# Patient Record
Sex: FEMALE
[De-identification: names, ages, dates, MRNs, and addresses within clinical notes are randomized per-mention and may not be internally consistent; named-entity substitution may affect disease eponyms.]

---

## 2023-03-05 ENCOUNTER — NON-APPOINTMENT (OUTPATIENT)
Age: 73
End: 2023-03-05

## 2023-03-06 PROBLEM — Z00.00 ENCOUNTER FOR PREVENTIVE HEALTH EXAMINATION: Status: ACTIVE | Noted: 2023-03-06

## 2023-03-07 ENCOUNTER — APPOINTMENT (OUTPATIENT)
Dept: ORTHOPEDIC SURGERY | Facility: CLINIC | Age: 73
End: 2023-03-07
Payer: SELF-PAY

## 2023-03-07 VITALS — HEIGHT: 70 IN | BODY MASS INDEX: 21.47 KG/M2 | WEIGHT: 150 LBS

## 2023-03-07 DIAGNOSIS — S52.571A OTHER INTRAARTICULAR FRACTURE OF LOWER END OF RIGHT RADIUS, INITIAL ENCOUNTER FOR CLOSED FRACTURE: ICD-10-CM

## 2023-03-07 DIAGNOSIS — Z78.9 OTHER SPECIFIED HEALTH STATUS: ICD-10-CM

## 2023-03-07 PROCEDURE — 99203 OFFICE O/P NEW LOW 30 MIN: CPT

## 2023-03-07 NOTE — HISTORY OF PRESENT ILLNESS
[8] : 8 [7] : 7 [Dull/Aching] : dull/aching [de-identified] : She fell on uneven sidewalk yesterday\par She has R hand and wrist pain  [] : no [FreeTextEntry1] : R hand [FreeTextEntry3] : 3/6/23 [FreeTextEntry5] : she fell on uneven side walk. in brace. she saw UC yesterday and placed in brace,xr. confirmed fx. has N/T [FreeTextEntry9] : brace

## 2023-03-07 NOTE — ASSESSMENT
[FreeTextEntry1] : Due to nature of fracture I rec ORIF (surgery) as there is volar displacement that I think will worsen over time. \par She is from [facility] and does not have out of state cove[place]age. She is returning home next week. \par I placed her in a cast today. She was told to make appt with ortho for next week when she is home. \par She will get xrays at that time- if any displacement, she will require surgery ASAP. Therefore, needs PMD referral now, so she has ortho appt ASAP.\par \par Return prn

## 2023-07-14 NOTE — PHYSICAL EXAM
[de-identified] : R wrist\par Swelling\par Tender DR\par Limited ROM\par NVI\par \par Xrays min dispalced volar ewing fracture  no